# Patient Record
Sex: MALE | Race: WHITE | NOT HISPANIC OR LATINO | Employment: OTHER | URBAN - METROPOLITAN AREA
[De-identification: names, ages, dates, MRNs, and addresses within clinical notes are randomized per-mention and may not be internally consistent; named-entity substitution may affect disease eponyms.]

---

## 2018-01-18 ENCOUNTER — GENERIC CONVERSION - ENCOUNTER (OUTPATIENT)
Dept: OTHER | Facility: OTHER | Age: 63
End: 2018-01-18

## 2018-01-19 ENCOUNTER — ALLSCRIPTS OFFICE VISIT (OUTPATIENT)
Dept: OTHER | Facility: OTHER | Age: 63
End: 2018-01-19

## 2018-01-19 ENCOUNTER — TRANSCRIBE ORDERS (OUTPATIENT)
Dept: ADMINISTRATIVE | Facility: HOSPITAL | Age: 63
End: 2018-01-19

## 2018-01-19 DIAGNOSIS — N20.0 KIDNEY STONE: Primary | ICD-10-CM

## 2018-01-19 LAB
BILIRUB UR QL STRIP: NORMAL
CLARITY UR: NORMAL
COLOR UR: YELLOW
GLUCOSE (HISTORICAL): NORMAL
HGB UR QL STRIP.AUTO: NORMAL
KETONES UR STRIP-MCNC: NORMAL MG/DL
LEUKOCYTE ESTERASE UR QL STRIP: NORMAL
NITRITE UR QL STRIP: NORMAL
PH UR STRIP.AUTO: 5 [PH]
PROT UR STRIP-MCNC: NORMAL MG/DL
SP GR UR STRIP.AUTO: 1.02
UROBILINOGEN UR QL STRIP.AUTO: 0.2

## 2018-01-22 VITALS
DIASTOLIC BLOOD PRESSURE: 80 MMHG | SYSTOLIC BLOOD PRESSURE: 140 MMHG | HEIGHT: 69 IN | WEIGHT: 225 LBS | BODY MASS INDEX: 33.33 KG/M2

## 2018-01-23 NOTE — MISCELLANEOUS
Message     Recorded as Task   Date: 01/18/2018 08:42 AM, Created By: Misael Norton   Task Name: Call Back   Assigned To: Morris Saenz,TEAM   Regarding Patient: Mitali Draper, Status: In Progress   Comment:    Karla Sands - 18 Jan 2018 8:42 AM     TASK CREATED  Caller: Self; [phone] (Home)  Pt of PB c/o frequency and cramping and would like to talk to a nurse  [phone]   Clarita Pettit - 18 Jan 2018 9:10 AM     TASK IN PROGRESS   Clarita Pettit - 18 Jan 2018 9:17 AM     TASK EDITED  Spoke with patient; He states that he has a hx of kidney stones and is currently experiencing intermittent LLQ pain and frequency of urination  He adds that he has also experienced nausea, vomiting, and diarrhea  Because Dr Andre Major does not have available appointments, he agreed to be scheduled with Linn Alcantara on 01/19/2018 at 10:45  Active Problems    1   Elbow pain, unspecified laterality ([id number]) ([id number])    Signatures   Electronically signed by : Lavell Dockery, ; Jan 18 2018  9:17AM EST                       (Author)

## 2018-01-24 ENCOUNTER — HOSPITAL ENCOUNTER (OUTPATIENT)
Dept: CT IMAGING | Facility: HOSPITAL | Age: 63
Discharge: HOME | End: 2018-01-24
Payer: COMMERCIAL

## 2018-01-24 DIAGNOSIS — N20.0 KIDNEY STONE: ICD-10-CM

## 2018-01-24 PROCEDURE — 74176 CT ABD & PELVIS W/O CONTRAST: CPT

## 2018-02-02 RX ORDER — AZITHROMYCIN 250 MG/1
TABLET, FILM COATED ORAL
COMMUNITY
Start: 2018-01-23

## 2018-02-02 RX ORDER — ALBUTEROL SULFATE 90 UG/1
2 AEROSOL, METERED RESPIRATORY (INHALATION)
COMMUNITY
Start: 2018-01-23

## 2018-02-02 RX ORDER — PRAVASTATIN SODIUM 20 MG
20 TABLET ORAL
COMMUNITY
Start: 2017-11-22

## 2018-02-02 RX ORDER — FLUTICASONE PROPIONATE 50 MCG
2 SPRAY, SUSPENSION (ML) NASAL
COMMUNITY
Start: 2018-01-23

## 2018-02-02 RX ORDER — MONTELUKAST SODIUM 10 MG/1
10 TABLET ORAL
COMMUNITY
Start: 2018-01-15

## 2018-02-05 ENCOUNTER — APPOINTMENT (OUTPATIENT)
Dept: LAB | Facility: HOSPITAL | Age: 63
End: 2018-02-05
Payer: COMMERCIAL

## 2018-02-05 ENCOUNTER — DOCUMENTATION (OUTPATIENT)
Dept: UROLOGY | Facility: MEDICAL CENTER | Age: 63
End: 2018-02-05

## 2018-02-05 ENCOUNTER — OFFICE VISIT (OUTPATIENT)
Dept: UROLOGY | Facility: MEDICAL CENTER | Age: 63
End: 2018-02-05
Payer: COMMERCIAL

## 2018-02-05 VITALS
BODY MASS INDEX: 34.22 KG/M2 | WEIGHT: 239 LBS | SYSTOLIC BLOOD PRESSURE: 120 MMHG | DIASTOLIC BLOOD PRESSURE: 70 MMHG | HEIGHT: 70 IN

## 2018-02-05 DIAGNOSIS — K86.2 PANCREATIC CYST: ICD-10-CM

## 2018-02-05 DIAGNOSIS — R10.32 LEFT LOWER QUADRANT PAIN: ICD-10-CM

## 2018-02-05 DIAGNOSIS — Z87.898 HISTORY OF DYSURIA: Primary | ICD-10-CM

## 2018-02-05 DIAGNOSIS — K86.2 PANCREATIC CYST: Primary | ICD-10-CM

## 2018-02-05 LAB
BUN SERPL-MCNC: 21 MG/DL (ref 5–25)
CREAT SERPL-MCNC: 1.06 MG/DL (ref 0.6–1.3)
GFR SERPL CREATININE-BSD FRML MDRD: 74 ML/MIN/1.73SQ M
SL AMB  POCT GLUCOSE, UA: ABNORMAL
SL AMB LEUKOCYTE ESTERASE,UA: ABNORMAL
SL AMB POCT BILIRUBIN,UA: ABNORMAL
SL AMB POCT BLOOD,UA: ABNORMAL
SL AMB POCT CLARITY,UA: CLEAR
SL AMB POCT COLOR,UA: YELLOW
SL AMB POCT KETONES,UA: ABNORMAL
SL AMB POCT NITRITE,UA: ABNORMAL
SL AMB POCT PH,UA: 5
SL AMB POCT SPECIFIC GRAVITY,UA: 1.02
SL AMB POCT URINE PROTEIN: ABNORMAL
SL AMB POCT UROBILINOGEN: ABNORMAL

## 2018-02-05 PROCEDURE — 82565 ASSAY OF CREATININE: CPT

## 2018-02-05 PROCEDURE — 81003 URINALYSIS AUTO W/O SCOPE: CPT | Performed by: NURSE PRACTITIONER

## 2018-02-05 PROCEDURE — 99213 OFFICE O/P EST LOW 20 MIN: CPT | Performed by: NURSE PRACTITIONER

## 2018-02-05 PROCEDURE — 84520 ASSAY OF UREA NITROGEN: CPT

## 2018-02-05 PROCEDURE — 36415 COLL VENOUS BLD VENIPUNCTURE: CPT

## 2018-02-05 RX ORDER — METHYLPREDNISOLONE 4 MG/1
TABLET ORAL
Refills: 0 | COMMUNITY
Start: 2018-01-15

## 2018-02-05 NOTE — PROGRESS NOTES
I have reviewed the notes, assessments, and/or procedures performed by ASHLEY, I concur with her/his documentation of Dereck Walker

## 2018-02-05 NOTE — PROGRESS NOTES
2/5/2018    Devorah Wang  1955  [id number]        Assessment  Left sided abdominal pain ; resolved  Pancreatic cyst  Known renal cyst    Plan  MRI abdomen with and without to evaluate pancreatic cyst   Will call with results and refer if needed        Discussion  Minh Malave is a 57 y o  male being managed        History of Present Illness  57 y o  male presents today for abdominal pain  follow up  Since last visit has had no pain or urinating issues  Here for FU  CT results  Review of Systems  Review of Systems - History obtained from chart review and the patient  General ROS: negative  Respiratory ROS: no cough, shortness of breath, or wheezing  Gastrointestinal ROS: no abdominal pain, change in bowel habits, or black or bloody stools  Genito-Urinary ROS: no dysuria, trouble voiding, or hematuria  Musculoskeletal ROS: negative          Past Medical History  Past Medical History:   Diagnosis Date    BPH without obstruction/lower urinary tract symptoms     Hypercholesterolemia     Kidney cysts     Kidney stone        Past Social History  Past Surgical History:   Procedure Laterality Date    KNEE SURGERY      ROTATOR CUFF REPAIR         Past Family History  Family History   Problem Relation Age of Onset    Heart attack Father        Past Social history  Social History     Social History    Marital status: /Civil Union     Spouse name: N/A    Number of children: N/A    Years of education: N/A     Occupational History    Not on file       Social History Main Topics    Smoking status: Never Smoker    Smokeless tobacco: Never Used    Alcohol use Yes      Comment: social    Drug use: No    Sexual activity: Not on file     Other Topics Concern    Not on file     Social History Narrative    No narrative on file       Current Medications  Current Outpatient Prescriptions   Medication Sig Dispense Refill    aspirin 81 MG tablet Take 81 mg by mouth      pravastatin (PRAVACHOL) 20 mg tablet Take 20 mg by mouth      albuterol (PROVENTIL HFA,VENTOLIN HFA) 90 mcg/act inhaler Inhale 2 puffs      azithromycin (ZITHROMAX) 250 mg tablet Take two tablets by mouth on first day, then 1 tablet daily until gone      fluticasone (FLONASE) 50 mcg/act nasal spray 2 sprays into each nostril      Methylprednisolone 4 MG TBPK FOLLOW PACKAGE DIRECTIONS  0    montelukast (SINGULAIR) 10 mg tablet Take 10 mg by mouth       No current facility-administered medications for this visit  Allergies  No Known Allergies    Past Medical History, Social History, Family History, medications and allergies were reviewed  Vitals  Vitals:    02/05/18 1047   BP: 120/70   Weight: 108 kg (239 lb)   Height: 5' 10" (1 778 m)       Physical Exam  Skin: warm, dry, intact  Cardiac: S1S2, HRR, Peripheral edema: negative  Pulmonary: Non-labored breathing  Abdomen: Soft, non-tender, non-distended  No surgical scars  No masses, tenderness, hernias noted  Musculoskeletal: AROM with no joint deformity or tenderness  Neurology: alert, oriented x3, affect appropriate  Genitourinary: Negative CVA tenderness, negative suprapubic tenderness            Results  CT scan : as noted ; cyst in pancreatic body needs evaluation

## 2018-02-08 ENCOUNTER — APPOINTMENT (OUTPATIENT)
Dept: RADIOLOGY | Facility: CLINIC | Age: 63
End: 2018-02-08
Payer: COMMERCIAL

## 2018-02-08 ENCOUNTER — HOSPITAL ENCOUNTER (OUTPATIENT)
Dept: MRI IMAGING | Facility: CLINIC | Age: 63
Discharge: HOME | End: 2018-02-08
Payer: COMMERCIAL

## 2018-02-08 DIAGNOSIS — R10.32 LEFT LOWER QUADRANT PAIN: ICD-10-CM

## 2018-02-08 DIAGNOSIS — K86.2 PANCREATIC CYST: ICD-10-CM

## 2018-02-08 PROCEDURE — A9585 GADOBUTROL INJECTION: HCPCS | Performed by: NURSE PRACTITIONER

## 2018-02-08 PROCEDURE — 70030 X-RAY EYE FOR FOREIGN BODY: CPT

## 2018-02-08 PROCEDURE — 74183 MRI ABD W/O CNTR FLWD CNTR: CPT

## 2018-02-08 PROCEDURE — 76377 3D RENDER W/INTRP POSTPROCES: CPT

## 2018-02-08 RX ADMIN — GADOBUTROL 10 ML: 604.72 INJECTION INTRAVENOUS at 11:21

## 2020-01-06 ENCOUNTER — TRANSCRIBE ORDERS (OUTPATIENT)
Dept: ADMINISTRATIVE | Facility: HOSPITAL | Age: 65
End: 2020-01-06

## 2020-01-06 DIAGNOSIS — M25.562 LEFT KNEE PAIN, UNSPECIFIED CHRONICITY: Primary | ICD-10-CM

## 2020-01-16 ENCOUNTER — HOSPITAL ENCOUNTER (OUTPATIENT)
Dept: MRI IMAGING | Facility: HOSPITAL | Age: 65
Discharge: HOME | End: 2020-01-16
Payer: MEDICARE

## 2020-01-16 DIAGNOSIS — M25.562 LEFT KNEE PAIN, UNSPECIFIED CHRONICITY: ICD-10-CM

## 2020-01-16 PROCEDURE — 73721 MRI JNT OF LWR EXTRE W/O DYE: CPT

## 2020-04-23 ENCOUNTER — EVALUATION (OUTPATIENT)
Dept: PHYSICAL THERAPY | Facility: CLINIC | Age: 65
End: 2020-04-23
Payer: MEDICARE

## 2020-04-23 DIAGNOSIS — G89.29 CHRONIC LEFT-SIDED LOW BACK PAIN WITHOUT SCIATICA: Primary | ICD-10-CM

## 2020-04-23 DIAGNOSIS — M54.50 CHRONIC LEFT-SIDED LOW BACK PAIN WITHOUT SCIATICA: Primary | ICD-10-CM

## 2020-04-23 PROCEDURE — 97161 PT EVAL LOW COMPLEX 20 MIN: CPT | Performed by: PHYSICAL THERAPIST

## 2020-04-23 PROCEDURE — 97110 THERAPEUTIC EXERCISES: CPT | Performed by: PHYSICAL THERAPIST

## 2020-04-23 PROCEDURE — 97140 MANUAL THERAPY 1/> REGIONS: CPT | Performed by: PHYSICAL THERAPIST

## 2020-04-28 ENCOUNTER — OFFICE VISIT (OUTPATIENT)
Dept: PHYSICAL THERAPY | Facility: CLINIC | Age: 65
End: 2020-04-28
Payer: MEDICARE

## 2020-04-28 DIAGNOSIS — G89.29 CHRONIC LEFT-SIDED LOW BACK PAIN WITHOUT SCIATICA: Primary | ICD-10-CM

## 2020-04-28 DIAGNOSIS — M54.50 CHRONIC LEFT-SIDED LOW BACK PAIN WITHOUT SCIATICA: Primary | ICD-10-CM

## 2020-04-28 PROCEDURE — 97110 THERAPEUTIC EXERCISES: CPT | Performed by: PHYSICAL THERAPIST

## 2020-04-28 PROCEDURE — 97140 MANUAL THERAPY 1/> REGIONS: CPT | Performed by: PHYSICAL THERAPIST

## 2020-04-28 PROCEDURE — 97112 NEUROMUSCULAR REEDUCATION: CPT | Performed by: PHYSICAL THERAPIST

## 2020-04-30 ENCOUNTER — OFFICE VISIT (OUTPATIENT)
Dept: PHYSICAL THERAPY | Facility: CLINIC | Age: 65
End: 2020-04-30
Payer: MEDICARE

## 2020-04-30 DIAGNOSIS — G89.29 CHRONIC LEFT-SIDED LOW BACK PAIN WITHOUT SCIATICA: Primary | ICD-10-CM

## 2020-04-30 DIAGNOSIS — M54.50 CHRONIC LEFT-SIDED LOW BACK PAIN WITHOUT SCIATICA: Primary | ICD-10-CM

## 2020-04-30 PROCEDURE — 97140 MANUAL THERAPY 1/> REGIONS: CPT

## 2020-04-30 PROCEDURE — 97110 THERAPEUTIC EXERCISES: CPT

## 2020-04-30 PROCEDURE — 97112 NEUROMUSCULAR REEDUCATION: CPT

## 2020-05-05 ENCOUNTER — OFFICE VISIT (OUTPATIENT)
Dept: PHYSICAL THERAPY | Facility: CLINIC | Age: 65
End: 2020-05-05
Payer: MEDICARE

## 2020-05-05 DIAGNOSIS — M54.50 CHRONIC LEFT-SIDED LOW BACK PAIN WITHOUT SCIATICA: Primary | ICD-10-CM

## 2020-05-05 DIAGNOSIS — G89.29 CHRONIC LEFT-SIDED LOW BACK PAIN WITHOUT SCIATICA: Primary | ICD-10-CM

## 2020-05-05 PROCEDURE — 97112 NEUROMUSCULAR REEDUCATION: CPT

## 2020-05-05 PROCEDURE — 97140 MANUAL THERAPY 1/> REGIONS: CPT

## 2020-05-05 PROCEDURE — 97110 THERAPEUTIC EXERCISES: CPT

## 2020-05-07 ENCOUNTER — OFFICE VISIT (OUTPATIENT)
Dept: PHYSICAL THERAPY | Facility: CLINIC | Age: 65
End: 2020-05-07
Payer: MEDICARE

## 2020-05-07 DIAGNOSIS — G89.29 CHRONIC LEFT-SIDED LOW BACK PAIN WITHOUT SCIATICA: Primary | ICD-10-CM

## 2020-05-07 DIAGNOSIS — M54.50 CHRONIC LEFT-SIDED LOW BACK PAIN WITHOUT SCIATICA: Primary | ICD-10-CM

## 2020-05-07 PROCEDURE — 97140 MANUAL THERAPY 1/> REGIONS: CPT | Performed by: PHYSICAL THERAPIST

## 2020-05-07 PROCEDURE — 97110 THERAPEUTIC EXERCISES: CPT | Performed by: PHYSICAL THERAPIST

## 2020-05-12 ENCOUNTER — OFFICE VISIT (OUTPATIENT)
Dept: PHYSICAL THERAPY | Facility: CLINIC | Age: 65
End: 2020-05-12
Payer: MEDICARE

## 2020-05-12 DIAGNOSIS — M54.50 CHRONIC LEFT-SIDED LOW BACK PAIN WITHOUT SCIATICA: Primary | ICD-10-CM

## 2020-05-12 DIAGNOSIS — G89.29 CHRONIC LEFT-SIDED LOW BACK PAIN WITHOUT SCIATICA: Primary | ICD-10-CM

## 2020-05-12 PROCEDURE — 97140 MANUAL THERAPY 1/> REGIONS: CPT | Performed by: PHYSICAL THERAPIST

## 2020-05-12 PROCEDURE — 97112 NEUROMUSCULAR REEDUCATION: CPT | Performed by: PHYSICAL THERAPIST

## 2020-05-14 ENCOUNTER — APPOINTMENT (OUTPATIENT)
Dept: PHYSICAL THERAPY | Facility: CLINIC | Age: 65
End: 2020-05-14
Payer: MEDICARE

## 2020-05-21 ENCOUNTER — OFFICE VISIT (OUTPATIENT)
Dept: PHYSICAL THERAPY | Facility: CLINIC | Age: 65
End: 2020-05-21
Payer: MEDICARE

## 2020-05-21 DIAGNOSIS — M54.50 CHRONIC LEFT-SIDED LOW BACK PAIN WITHOUT SCIATICA: Primary | ICD-10-CM

## 2020-05-21 DIAGNOSIS — G89.29 CHRONIC LEFT-SIDED LOW BACK PAIN WITHOUT SCIATICA: Primary | ICD-10-CM

## 2020-05-21 PROCEDURE — 97110 THERAPEUTIC EXERCISES: CPT | Performed by: PHYSICAL THERAPIST

## 2020-05-21 PROCEDURE — 97014 ELECTRIC STIMULATION THERAPY: CPT | Performed by: PHYSICAL THERAPIST

## 2020-05-21 PROCEDURE — 97112 NEUROMUSCULAR REEDUCATION: CPT | Performed by: PHYSICAL THERAPIST

## 2020-05-26 ENCOUNTER — OFFICE VISIT (OUTPATIENT)
Dept: PHYSICAL THERAPY | Facility: CLINIC | Age: 65
End: 2020-05-26
Payer: MEDICARE

## 2020-05-26 DIAGNOSIS — G89.29 CHRONIC LEFT-SIDED LOW BACK PAIN WITHOUT SCIATICA: Primary | ICD-10-CM

## 2020-05-26 DIAGNOSIS — M54.50 CHRONIC LEFT-SIDED LOW BACK PAIN WITHOUT SCIATICA: Primary | ICD-10-CM

## 2020-05-26 PROCEDURE — 97110 THERAPEUTIC EXERCISES: CPT

## 2020-05-26 PROCEDURE — 97014 ELECTRIC STIMULATION THERAPY: CPT

## 2020-05-26 PROCEDURE — 97140 MANUAL THERAPY 1/> REGIONS: CPT

## 2020-05-28 ENCOUNTER — OFFICE VISIT (OUTPATIENT)
Dept: PHYSICAL THERAPY | Facility: CLINIC | Age: 65
End: 2020-05-28
Payer: MEDICARE

## 2020-05-28 DIAGNOSIS — G89.29 CHRONIC LEFT-SIDED LOW BACK PAIN WITHOUT SCIATICA: Primary | ICD-10-CM

## 2020-05-28 DIAGNOSIS — M54.50 CHRONIC LEFT-SIDED LOW BACK PAIN WITHOUT SCIATICA: Primary | ICD-10-CM

## 2020-05-28 PROCEDURE — 97112 NEUROMUSCULAR REEDUCATION: CPT

## 2020-05-28 PROCEDURE — 97140 MANUAL THERAPY 1/> REGIONS: CPT

## 2020-06-18 ENCOUNTER — HOSPITAL ENCOUNTER (OUTPATIENT)
Dept: RADIOLOGY | Facility: HOSPITAL | Age: 65
Discharge: HOME | End: 2020-06-18
Payer: MEDICARE

## 2020-06-18 ENCOUNTER — TRANSCRIBE ORDERS (OUTPATIENT)
Dept: ADMINISTRATIVE | Facility: HOSPITAL | Age: 65
End: 2020-06-18

## 2020-06-18 DIAGNOSIS — M54.50 LUMBAR PAIN: Primary | ICD-10-CM

## 2020-06-18 DIAGNOSIS — M54.50 LUMBAR PAIN: ICD-10-CM

## 2020-06-18 PROCEDURE — 72114 X-RAY EXAM L-S SPINE BENDING: CPT

## 2021-03-10 DIAGNOSIS — Z23 ENCOUNTER FOR IMMUNIZATION: ICD-10-CM

## 2021-09-17 ENCOUNTER — NURSE TRIAGE (OUTPATIENT)
Dept: OTHER | Facility: OTHER | Age: 66
End: 2021-09-17

## 2021-09-17 DIAGNOSIS — Z20.828 SARS-ASSOCIATED CORONAVIRUS EXPOSURE: Primary | ICD-10-CM

## 2021-09-17 NOTE — TELEPHONE ENCOUNTER
Regarding: Covid Exposure Asymptomatic   ----- Message from [facility] sent at 9/17/2021  2:23 PM EDT -----  "exposed to someone, it was my wife we both got tested previously on the 8th and hers came back positive and mine negative, my docs nurse told me to get another test after 5 days its been 9  Im not having symptoms really "

## 2021-09-18 PROCEDURE — U0005 INFEC AGEN DETEC AMPLI PROBE: HCPCS | Performed by: FAMILY MEDICINE

## 2021-09-18 PROCEDURE — U0003 INFECTIOUS AGENT DETECTION BY NUCLEIC ACID (DNA OR RNA); SEVERE ACUTE RESPIRATORY SYNDROME CORONAVIRUS 2 (SARS-COV-2) (CORONAVIRUS DISEASE [COVID-19]), AMPLIFIED PROBE TECHNIQUE, MAKING USE OF HIGH THROUGHPUT TECHNOLOGIES AS DESCRIBED BY CMS-2020-01-R: HCPCS | Performed by: FAMILY MEDICINE

## 2021-09-18 NOTE — TELEPHONE ENCOUNTER
Regarding: Exposure to covid   ----- Message from An Damon sent at 9/17/2021  7:14 PM EDT -----  " I am in contact with my wife who tested positive for covid "

## 2021-09-18 NOTE — TELEPHONE ENCOUNTER
Reason for Disposition   [1] CLOSE CONTACT COVID-19 EXPOSURE within last 14 days AND [2] NO symptoms    Answer Assessment - Initial Assessment Questions  1  Were you within 6 feet or less, for up to 15 minutes or more with a person that has a confirmed COVID-19 test?   Yes     2  What was the date of your exposure? 9/8    3  Are you experiencing any symptoms attributed to the virus?  (Assess for SOB, cough, fever, difficulty breathing)   No     4   HIGH RISK: Do you have any history heart or lung conditions, weakened immune system, diabetes, Asthma, CHF, HIV, COPD, Chemo, renal failure, sickle cell, etc?   No    Protocols used: CORONAVIRUS (COVID-19) EXPOSURE-ADULT-AH

## 2024-07-31 ENCOUNTER — HOSPITAL ENCOUNTER (OUTPATIENT)
Dept: MRI IMAGING | Facility: CLINIC | Age: 69
Discharge: HOME | End: 2024-07-31
Payer: MEDICARE

## 2024-07-31 DIAGNOSIS — M43.06 SPONDYLOLYSIS, LUMBAR REGION: ICD-10-CM

## 2024-07-31 PROCEDURE — A9585 GADOBUTROL INJECTION: HCPCS | Performed by: RADIOLOGY

## 2024-07-31 PROCEDURE — 72158 MRI LUMBAR SPINE W/O & W/DYE: CPT

## 2024-07-31 RX ORDER — GADOBUTROL 604.72 MG/ML
10 INJECTION INTRAVENOUS
Status: COMPLETED | OUTPATIENT
Start: 2024-07-31 | End: 2024-07-31

## 2024-07-31 RX ADMIN — GADOBUTROL 10 ML: 604.72 INJECTION INTRAVENOUS at 13:30

## 2024-12-05 ENCOUNTER — APPOINTMENT (OUTPATIENT)
Dept: RADIOLOGY | Facility: CLINIC | Age: 69
End: 2024-12-05
Payer: MEDICARE

## 2024-12-05 DIAGNOSIS — R20.2 PARESTHESIA: ICD-10-CM

## 2024-12-05 DIAGNOSIS — R20.2 NUMBNESS AND TINGLING: ICD-10-CM

## 2024-12-05 DIAGNOSIS — R20.0 NUMBNESS AND TINGLING: ICD-10-CM

## 2024-12-05 PROCEDURE — 72050 X-RAY EXAM NECK SPINE 4/5VWS: CPT

## 2024-12-20 ENCOUNTER — OFFICE VISIT (OUTPATIENT)
Dept: LAB | Facility: HOSPITAL | Age: 69
End: 2024-12-20
Payer: MEDICARE

## 2024-12-20 DIAGNOSIS — Z01.810 ENCOUNTER FOR PREPROCEDURAL CARDIOVASCULAR EXAMINATION: ICD-10-CM

## 2024-12-20 DIAGNOSIS — Z86.0101 PERSONAL HISTORY OF ADENOMATOUS AND SERRATED COLON POLYPS: ICD-10-CM

## 2024-12-20 LAB
ATRIAL RATE: 57 BPM
P AXIS: 37 DEGREES
PR INTERVAL: 190 MS
QRS AXIS: 1 DEGREES
QRSD INTERVAL: 100 MS
QT INTERVAL: 436 MS
QTC INTERVAL: 425 MS
T WAVE AXIS: 34 DEGREES
VENTRICULAR RATE: 57 BPM

## 2024-12-20 PROCEDURE — 93010 ELECTROCARDIOGRAM REPORT: CPT | Performed by: STUDENT IN AN ORGANIZED HEALTH CARE EDUCATION/TRAINING PROGRAM

## 2024-12-20 PROCEDURE — 93005 ELECTROCARDIOGRAM TRACING: CPT

## 2025-06-30 ENCOUNTER — TELEPHONE (OUTPATIENT)
Age: 70
End: 2025-06-30

## 2025-06-30 ENCOUNTER — DOCUMENTATION (OUTPATIENT)
Dept: HEMATOLOGY ONCOLOGY | Facility: CLINIC | Age: 70
End: 2025-06-30

## 2025-06-30 NOTE — TELEPHONE ENCOUNTER
I called Petros in response to a referral that was received for patient to establish care with Surgical Oncology    Outreach was made to schedule a consultation.    A consultation was scheduled for patient during this call. Patient is scheduled on 7/17/25 at 1:15 PM with Dr Healy at the [facility]  Has the patient been seen inpatient or outpatient ? (Within in the last 3 years) No If so when, N/A    Schedule within: 7 days  Awaiting Records: Yes   DO NOT SCHEDULE PRIOR TO 3 DAYS    Pt scheduled for first available appt at requested location.   Please advise if this appt can be moved sooner to meet scheduling guidelines per NN review.

## 2025-06-30 NOTE — PROGRESS NOTES
PN please retrieve outside records.  [] Email sent to pt with Authorization for Release of Health Information  Pt referred to surgical oncology and should be scheduled within 7 days.  Please notify me if not scheduled within time frame.    Referral received/ Chart reviewed for work up completed   Diagnosis: Melanoma in situ  Referring provider: Dr Irby    Imaging completed:    [] PET/CT   [] MRI   [] CT   [] US   [] Mammo   [] Bone scan   [x] N/A    Pathology completed:    Date:6/12/25 Right mid cheek skin shave biopsy   Location:[facility],  [phone]   []N/A    Additional records needed:    [] Genomic report   [] Genetic testing results   [] Office Note   [] Procedure/ Operative note   [] Lab results   [x] Colored photo of biopsied site      [] Radiation Oncology records retrieval needed (PN to route to rad/onc clerical pool once scheduled)  Date:  Location:

## 2025-07-01 ENCOUNTER — PATIENT OUTREACH (OUTPATIENT)
Dept: HEMATOLOGY ONCOLOGY | Facility: CLINIC | Age: 70
End: 2025-07-01

## 2025-07-01 NOTE — PROGRESS NOTES
Intake received, chart reviewed for need of external records.  Consulting: Dr. VARGAS  Scheduled on 7/7/2025  Dx: MELANOMA   ICD: C43.9    Pathology slides requested:   From MARY  phone [phone], via fax [phone]  Accession # [id number]  Slides will be sent directly to [facility] Pathology lab at [address] [place] [address].

## 2025-07-01 NOTE — TELEPHONE ENCOUNTER
Called and spoke to patient. Provided a sooner appointment with dr. Marx. Patient now scheduled on 7/7 at 9:00 am in [place]

## 2025-07-01 NOTE — PROGRESS NOTES
Initial outreach. Spoke to patient. Introduced myself and explained the role of an Oncology Nurse Navigator to assist with coordination of cancer care, preparation for upcoming appointment, be a point of contact prior to oncology consult, provide support and connect him with available resources. Discussed surgical oncology referral placed by Dr Roth for melanoma. Explained I will be their main contact until they are established with their team and a treatment plan is established.     Introduced Cony, and explained she will be his patient navigator, who will reach out after the first consult to introduce themselves. The PN will provide support, make sure he has a good understanding of the plan and schedule and to connect with additional resources if/when needed.     Reviewed upcoming appointments including date, time and location.  Assessed for barriers of care. All questions answered at this time. My direct contact information provided. Patient is aware that he can call me should he need any further assistance. Patient was appreciative of call.     Prior history of skin cancer: no    Dermatology Provider: Dr Roth  PCP: Cesar Payne MD  Insurance:  MEDICARE A AND B,  [facility] HEALTH OPTIONS PROGRAM     Do you have a history of cancer? no  Have you ever received Radiation Therapy? No  Where  did you receive RT?    When?   Do you have any implantable devices?   [] Pacemaker  [] Pain stimulator  [] Defibrillator  [] Implanted sleep apnea device  [x] NA      Was it placed at [facility]?    If not, where was it placed?     When?

## 2025-07-02 PROBLEM — D03.30 MELANOMA IN SITU OF FACE EXCLUDING EYELID, NOSE, LIP, AND EAR (HCC): Status: ACTIVE | Noted: 2025-07-02

## 2025-07-07 ENCOUNTER — CONSULT (OUTPATIENT)
Dept: SURGICAL ONCOLOGY | Facility: CLINIC | Age: 70
End: 2025-07-07
Payer: MEDICARE

## 2025-07-07 ENCOUNTER — HOSPITAL ENCOUNTER (OUTPATIENT)
Dept: RADIOLOGY | Facility: HOSPITAL | Age: 70
Discharge: HOME | End: 2025-07-07
Payer: MEDICARE

## 2025-07-07 VITALS
WEIGHT: 239 LBS | BODY MASS INDEX: 34.22 KG/M2 | SYSTOLIC BLOOD PRESSURE: 134 MMHG | DIASTOLIC BLOOD PRESSURE: 78 MMHG | OXYGEN SATURATION: 97 % | HEART RATE: 65 BPM | HEIGHT: 70 IN | TEMPERATURE: 97.9 F

## 2025-07-07 DIAGNOSIS — D03.30 MELANOMA IN SITU OF FACE EXCLUDING EYELID, NOSE, LIP, AND EAR (HCC): Primary | ICD-10-CM

## 2025-07-07 DIAGNOSIS — D03.30 MELANOMA IN SITU OF FACE EXCLUDING EYELID, NOSE, LIP, AND EAR (HCC): ICD-10-CM

## 2025-07-07 PROCEDURE — 99205 OFFICE O/P NEW HI 60 MIN: CPT | Performed by: SURGERY

## 2025-07-07 PROCEDURE — 71046 X-RAY EXAM CHEST 2 VIEWS: CPT

## 2025-07-07 RX ORDER — ACETAMINOPHEN AND CODEINE PHOSPHATE 300; 30 MG/1; MG/1
1 TABLET ORAL EVERY 6 HOURS PRN
Qty: 20 TABLET | Refills: 0 | Status: SHIPPED | OUTPATIENT
Start: 2025-07-07

## 2025-07-07 NOTE — PROGRESS NOTES
Name: Petros Lott      : 1955      MRN: [id number]  Encounter Provider: Pasha Marx MD  Encounter Date: 2025   Encounter department: [facility] [place]  :  Assessment & Plan  Melanoma in situ of face excluding eyelid, nose, lip, and ear (HCC)    Orders:    Case request operating room: WIDE RADICAL EXCISION OF RIGHT CHEEK MELANOMA; Standing    EKG 12 lead; Future    XR chest pa and lateral; Future    acetaminophen-codeine (TYLENOL with CODEINE #3) 300-30 MG per tablet; Take 1 tablet by mouth every 6 (six) hours as needed for moderate pain    This 70-year-old gentleman has been referred for surgical oncology consult Chin with newly diagnosed melanoma in situ right cheek.  He has had this skin lesion for quite some time recently it is discolored and dermatologist decided to perform shave biopsy.  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  He is here with his wife to discuss further workup and management.  He also recently had trauma to his right hand and being treated with splint and pending hand surgery evaluation today.  He is overall doing well.  We did discuss the skin cancers especially melanoma in situ as well as malignant melanoma.  Further management.  Surgical consent was obtained for wide radical excision after explaining the benefit procedure alternative and possible complications such as bleeding infection injury to the surrounding structures i.e. branches of facial nerve, scarring, local recurrence, need for additional procedures and treatment and distant disease were all reviewed and discussed..  We also discussed the cancer disease status cancer treatment plan and cancer treatment goals with the patient in detail.  All patient's and his wife's questions were answered to their satisfaction.  Consent will be obtained today and will proceed with surgery as soon as possible.    History of Present Illness   Chief  "Complaint   Patient presents with    Consult     NP-Right mid cheek melanoma in situ     No follow-ups on file.  S  Pertinent Medical History   Melanoma in situ        Oncology History   Cancer Staging   No matching staging information was found for the patient.  Oncology History   Melanoma in situ of face excluding eyelid, nose, lip, and ear (HCC)   6/12/2025 Initial Diagnosis    Melanoma in situ of face excluding eyelid, nose, lip, and ear (HCC)     6/12/2025 Biopsy    A. Skin, Right Mid Cheek Shave Biopsy  Melanoma In Situ, Lentigo Maligna type  1.2cm x 0.6cm          Review of Systems   Constitutional:  Negative for chills and fever.   HENT:  Negative for ear pain and sore throat.    Eyes:  Negative for pain and visual disturbance.   Respiratory:  Negative for cough and shortness of breath.    Cardiovascular:  Negative for chest pain and palpitations.   Gastrointestinal:  Negative for abdominal pain and vomiting.   Genitourinary:  Negative for dysuria and hematuria.   Musculoskeletal:  Positive for back pain and gait problem. Negative for arthralgias.   Skin:  Negative for color change and rash.   Neurological:  Negative for seizures and syncope.   Hematological:  Negative for adenopathy.   All other systems reviewed and are negative.   A complete review of systems is negative other than that noted above in the HPI.    Medical History Reviewed by provider this encounter:  Tobacco  Allergies  Meds  Problems  Med Hx  Surg Hx  Fam Hx     .         Objective   /78 (BP Location: Left arm, Patient Position: Sitting)   Pulse 65   Temp 97.9 °F (36.6 °C) (Temporal)   Ht 5' 10\" (1.778 m)   Wt 108 kg (239 lb)   SpO2 97%   BMI 34.29 kg/m²     Pain Screening:     ECOG    Physical Exam  HENT:      Head:        Comments: Right cheek biopsy-proven melanoma in situ.  No regional adenopathy.    Neurological:      Mental Status: He is oriented to person, place, and time. Mental status is at baseline.      " "  Constitutional: General appearance: The Patient is well-developed and well-nourished who appears the stated age in no acute distress. Patient is pleasant and talkative.  HEENT: Normocephalic. Sclerae are anicteric. Mucous membranes are moist. Neck is supple without adenopathy. No JVD.  Chest: The lungs are clear to auscultation.  Cardiac: Heart is regular rate.  Abdomen: Abdomen is soft, non-tender, non-distended and without masses.  Extremities: There is no clubbing or cyanosis. There is no edema. Symmetric.  Neuro: Grossly nonfocal. Gait is normal.  Lymphatic: No evidence of cervical adenopathy bilaterally.  No evidence of axillary adenopathy bilaterally. No evidence of inguinal adenopathy bilaterally.  Skin: Warm, anicteric.  Psych: Patient is pleasant and talkative    Labs: I have reviewed pertinent labs. No results found for: \"WBC\", \"RBC\", \"HGB\", \"HCT\", \"MCV\", \"MCH\", \"RDW\", \"PLT\", \"NEUTOPHILPCT\", \"BANDSPCT\", \"LYMPHOPCT\", \"MONOPCT\", \"EOSPCT\", \"BASOPCT\", \"IMMGRANS\", \"NEUTROABS\"   Lab Results   Component Value Date/Time    Sodium 141 06/02/2025 07:04 AM    Potassium 5.2 (H) 06/02/2025 07:04 AM    Chloride 103 06/02/2025 07:04 AM    Carbon Dioxide 25 06/02/2025 07:04 AM    Anion Gap 13 06/02/2025 07:04 AM    BUN 18 06/02/2025 07:04 AM    Creatinine 0.9 06/02/2025 07:04 AM    Glucose 103 06/02/2025 07:04 AM    Calcium 9.4 06/02/2025 07:04 AM    AST 19 06/02/2025 07:04 AM    ALT 24 06/02/2025 07:04 AM    Alkaline Phosphatase 58 06/02/2025 07:04 AM    Protein, Total 6.3 06/02/2025 07:04 AM    Albumin 4.2 06/02/2025 07:04 AM    Total Bilirubin 0.3 06/02/2025 07:04 AM    EGFR 90 06/02/2025 07:04 AM        Radiology Results Review : No pertinent imaging studies reviewed.      Administrative Statements   I have spent a total time of 55 minutes in caring for this patient on the day of the visit/encounter including Diagnostic results, Prognosis, Risks and benefits of tx options, Instructions for management, Patient and " family education, Importance of tx compliance, Risk factor reductions, Impressions, Counseling / Coordination of care, Documenting in the medical record, Reviewing/placing orders in the medical record (including tests, medications, and/or procedures), and Obtaining or reviewing history  .

## 2025-07-07 NOTE — H&P (VIEW-ONLY)
Name: Petros Lott      : 1955      MRN: [id number]  Encounter Provider: Pasha Marx MD  Encounter Date: 2025   Encounter department: [facility] [place]  :  Assessment & Plan  Melanoma in situ of face excluding eyelid, nose, lip, and ear (HCC)    Orders:    Case request operating room: WIDE RADICAL EXCISION OF RIGHT CHEEK MELANOMA; Standing    EKG 12 lead; Future    XR chest pa and lateral; Future    acetaminophen-codeine (TYLENOL with CODEINE #3) 300-30 MG per tablet; Take 1 tablet by mouth every 6 (six) hours as needed for moderate pain    This 70-year-old gentleman has been referred for surgical oncology consult Chin with newly diagnosed melanoma in situ right cheek.  He has had this skin lesion for quite some time recently it is discolored and dermatologist decided to perform shave biopsy.  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  He is here with his wife to discuss further workup and management.  He also recently had trauma to his right hand and being treated with splint and pending hand surgery evaluation today.  He is overall doing well.  We did discuss the skin cancers especially melanoma in situ as well as malignant melanoma.  Further management.  Surgical consent was obtained for wide radical excision after explaining the benefit procedure alternative and possible complications such as bleeding infection injury to the surrounding structures i.e. branches of facial nerve, scarring, local recurrence, need for additional procedures and treatment and distant disease were all reviewed and discussed..  We also discussed the cancer disease status cancer treatment plan and cancer treatment goals with the patient in detail.  All patient's and his wife's questions were answered to their satisfaction.  Consent will be obtained today and will proceed with surgery as soon as possible.    History of Present Illness   Chief  "Complaint   Patient presents with    Consult     NP-Right mid cheek melanoma in situ     No follow-ups on file.  S  Pertinent Medical History   Melanoma in situ        Oncology History   Cancer Staging   No matching staging information was found for the patient.  Oncology History   Melanoma in situ of face excluding eyelid, nose, lip, and ear (HCC)   6/12/2025 Initial Diagnosis    Melanoma in situ of face excluding eyelid, nose, lip, and ear (HCC)     6/12/2025 Biopsy    A. Skin, Right Mid Cheek Shave Biopsy  Melanoma In Situ, Lentigo Maligna type  1.2cm x 0.6cm          Review of Systems   Constitutional:  Negative for chills and fever.   HENT:  Negative for ear pain and sore throat.    Eyes:  Negative for pain and visual disturbance.   Respiratory:  Negative for cough and shortness of breath.    Cardiovascular:  Negative for chest pain and palpitations.   Gastrointestinal:  Negative for abdominal pain and vomiting.   Genitourinary:  Negative for dysuria and hematuria.   Musculoskeletal:  Positive for back pain and gait problem. Negative for arthralgias.   Skin:  Negative for color change and rash.   Neurological:  Negative for seizures and syncope.   Hematological:  Negative for adenopathy.   All other systems reviewed and are negative.   A complete review of systems is negative other than that noted above in the HPI.    Medical History Reviewed by provider this encounter:  Tobacco  Allergies  Meds  Problems  Med Hx  Surg Hx  Fam Hx     .         Objective   /78 (BP Location: Left arm, Patient Position: Sitting)   Pulse 65   Temp 97.9 °F (36.6 °C) (Temporal)   Ht 5' 10\" (1.778 m)   Wt 108 kg (239 lb)   SpO2 97%   BMI 34.29 kg/m²     Pain Screening:     ECOG    Physical Exam  HENT:      Head:        Comments: Right cheek biopsy-proven melanoma in situ.  No regional adenopathy.    Neurological:      Mental Status: He is oriented to person, place, and time. Mental status is at baseline.      " "  Constitutional: General appearance: The Patient is well-developed and well-nourished who appears the stated age in no acute distress. Patient is pleasant and talkative.  HEENT: Normocephalic. Sclerae are anicteric. Mucous membranes are moist. Neck is supple without adenopathy. No JVD.  Chest: The lungs are clear to auscultation.  Cardiac: Heart is regular rate.  Abdomen: Abdomen is soft, non-tender, non-distended and without masses.  Extremities: There is no clubbing or cyanosis. There is no edema. Symmetric.  Neuro: Grossly nonfocal. Gait is normal.  Lymphatic: No evidence of cervical adenopathy bilaterally.  No evidence of axillary adenopathy bilaterally. No evidence of inguinal adenopathy bilaterally.  Skin: Warm, anicteric.  Psych: Patient is pleasant and talkative    Labs: I have reviewed pertinent labs. No results found for: \"WBC\", \"RBC\", \"HGB\", \"HCT\", \"MCV\", \"MCH\", \"RDW\", \"PLT\", \"NEUTOPHILPCT\", \"BANDSPCT\", \"LYMPHOPCT\", \"MONOPCT\", \"EOSPCT\", \"BASOPCT\", \"IMMGRANS\", \"NEUTROABS\"   Lab Results   Component Value Date/Time    Sodium 141 06/02/2025 07:04 AM    Potassium 5.2 (H) 06/02/2025 07:04 AM    Chloride 103 06/02/2025 07:04 AM    Carbon Dioxide 25 06/02/2025 07:04 AM    Anion Gap 13 06/02/2025 07:04 AM    BUN 18 06/02/2025 07:04 AM    Creatinine 0.9 06/02/2025 07:04 AM    Glucose 103 06/02/2025 07:04 AM    Calcium 9.4 06/02/2025 07:04 AM    AST 19 06/02/2025 07:04 AM    ALT 24 06/02/2025 07:04 AM    Alkaline Phosphatase 58 06/02/2025 07:04 AM    Protein, Total 6.3 06/02/2025 07:04 AM    Albumin 4.2 06/02/2025 07:04 AM    Total Bilirubin 0.3 06/02/2025 07:04 AM    EGFR 90 06/02/2025 07:04 AM        Radiology Results Review : No pertinent imaging studies reviewed.      Administrative Statements   I have spent a total time of 55 minutes in caring for this patient on the day of the visit/encounter including Diagnostic results, Prognosis, Risks and benefits of tx options, Instructions for management, Patient and " family education, Importance of tx compliance, Risk factor reductions, Impressions, Counseling / Coordination of care, Documenting in the medical record, Reviewing/placing orders in the medical record (including tests, medications, and/or procedures), and Obtaining or reviewing history  .

## 2025-07-07 NOTE — ASSESSMENT & PLAN NOTE
Orders:    Case request operating room: WIDE RADICAL EXCISION OF RIGHT CHEEK MELANOMA; Standing    EKG 12 lead; Future    XR chest pa and lateral; Future    acetaminophen-codeine (TYLENOL with CODEINE #3) 300-30 MG per tablet; Take 1 tablet by mouth every 6 (six) hours as needed for moderate pain

## 2025-07-08 ENCOUNTER — OFFICE VISIT (OUTPATIENT)
Dept: LAB | Facility: HOSPITAL | Age: 70
End: 2025-07-08
Payer: MEDICARE

## 2025-07-08 DIAGNOSIS — D03.30 MELANOMA IN SITU OF FACE EXCLUDING EYELID, NOSE, LIP, AND EAR (HCC): ICD-10-CM

## 2025-07-08 LAB
ATRIAL RATE: 58 BPM
P AXIS: 21 DEGREES
PR INTERVAL: 184 MS
QRS AXIS: 15 DEGREES
QRSD INTERVAL: 98 MS
QT INTERVAL: 420 MS
QTC INTERVAL: 413 MS
T WAVE AXIS: 6 DEGREES
VENTRICULAR RATE: 58 BPM

## 2025-07-08 PROCEDURE — 93005 ELECTROCARDIOGRAM TRACING: CPT

## 2025-07-08 PROCEDURE — 93010 ELECTROCARDIOGRAM REPORT: CPT | Performed by: INTERNAL MEDICINE

## 2025-07-09 ENCOUNTER — PATIENT OUTREACH (OUTPATIENT)
Dept: HEMATOLOGY ONCOLOGY | Facility: CLINIC | Age: 70
End: 2025-07-09

## 2025-07-09 ENCOUNTER — LAB REQUISITION (OUTPATIENT)
Dept: LAB | Facility: HOSPITAL | Age: 70
End: 2025-07-09
Payer: MEDICARE

## 2025-07-09 DIAGNOSIS — C43.9 MALIGNANT MELANOMA OF SKIN, UNSPECIFIED (HCC): ICD-10-CM

## 2025-07-09 PROCEDURE — 88321 CONSLTJ&REPRT SLD PREP ELSWR: CPT | Performed by: PATHOLOGY

## 2025-07-17 RX ORDER — CEPHALEXIN 500 MG/1
500 CAPSULE ORAL
COMMUNITY
Start: 2025-07-07

## 2025-07-17 NOTE — PRE-PROCEDURE INSTRUCTIONS
Pre-Surgery Instructions:   Medication Instructions    albuterol (PROVENTIL HFA,VENTOLIN HFA) 90 mcg/act inhaler Uses PRN- OK to take day of surgery    aspirin 81 MG tablet Stop taking 7 days prior to surgery.    cephalexin (KEFLEX) 500 mg capsule Will be finished by surgery    fluticasone (FLONASE) 50 mcg/act nasal spray Uses PRN- OK to take day of surgery    pravastatin (PRAVACHOL) 20 mg tablet Take day of surgery.     Spoke with pt via phone.    Medication instructions for day of surgery reviewed. Patient verbalized understanding and agrees with the plan.  Please take all instructed medications with only a sip of water. Please do not take any over the counter (non-prescribed) vitamins or supplements for one week prior to date of surgery.      You will receive a call one business day prior to surgery with an arrival time and hospital directions. If your surgery is scheduled on a Monday, the hospital will be calling you on the Friday prior to your surgery. If you have not heard from anyone by 8pm, please call the hospital supervisor through the hospital  at [phone]. ([place] [phone] or [place] [phone]).    Do not eat or drink anything after midnight the night before your surgery, including candy, mints, lifesavers, or chewing gum. Do not drink alcohol 24hrs before your surgery. Try not to smoke at least 24hrs before your surgery.       Follow the pre surgery showering instructions as listed in the “My Surgical Experience Booklet” or otherwise provided by your surgeon's office. Do not use a blade to shave the surgical area 1 week before surgery. It is okay to use a clean electric clippers up to 24 hours before surgery. Do not apply any lotions, creams, including makeup, cologne, deodorant, or perfumes after showering on the day of your surgery. Do not use dry shampoo, hair spray, hair gel, or any type of hair products.     No contact lenses, eye make-up, or artificial eyelashes. Remove nail  polish, including gel polish, and any artificial, gel, or acrylic nails if possible. Remove all jewelry including rings and body piercing jewelry.     Wear causal clothing that is easy to take on and off. Consider your type of surgery.    Keep any valuables, jewelry, piercings at home. Please bring any specially ordered equipment (sling, braces) if indicated.    Arrange for a responsible person to drive you to and from the hospital on the day of your surgery. Please confirm the visitor policy for the day of your procedure when you receive your phone call with an arrival time.     Call the surgeon's office with any new illnesses, exposures, or additional questions prior to surgery.    Please reference your “My Surgical Experience Booklet” for additional information to prepare for your upcoming surgery.

## 2025-07-24 ENCOUNTER — ANESTHESIA EVENT (OUTPATIENT)
Dept: PERIOP | Facility: HOSPITAL | Age: 70
End: 2025-07-24
Payer: MEDICARE

## 2025-07-24 ENCOUNTER — HOSPITAL ENCOUNTER (OUTPATIENT)
Facility: HOSPITAL | Age: 70
Setting detail: OUTPATIENT SURGERY
Discharge: HOME | End: 2025-07-24
Attending: SURGERY | Admitting: SURGERY
Payer: MEDICARE

## 2025-07-24 ENCOUNTER — ANESTHESIA (OUTPATIENT)
Dept: PERIOP | Facility: HOSPITAL | Age: 70
End: 2025-07-24
Payer: MEDICARE

## 2025-07-24 VITALS
TEMPERATURE: 97.5 F | HEIGHT: 70 IN | RESPIRATION RATE: 13 BRPM | HEART RATE: 50 BPM | BODY MASS INDEX: 33.46 KG/M2 | WEIGHT: 233.69 LBS | DIASTOLIC BLOOD PRESSURE: 78 MMHG | SYSTOLIC BLOOD PRESSURE: 151 MMHG | OXYGEN SATURATION: 97 %

## 2025-07-24 DIAGNOSIS — D03.30 MELANOMA IN SITU OF FACE EXCLUDING EYELID, NOSE, LIP, AND EAR (HCC): ICD-10-CM

## 2025-07-24 PROCEDURE — 14301 TIS TRNFR ANY 30.1-60 SQ CM: CPT | Performed by: SURGERY

## 2025-07-24 PROCEDURE — 14302 TIS TRNFR ADDL 30 SQ CM: CPT | Performed by: SURGERY

## 2025-07-24 PROCEDURE — 14302 TIS TRNFR ADDL 30 SQ CM: CPT | Performed by: CLINICAL NURSE SPECIALIST

## 2025-07-24 PROCEDURE — 88305 TISSUE EXAM BY PATHOLOGIST: CPT | Performed by: PATHOLOGY

## 2025-07-24 PROCEDURE — 14301 TIS TRNFR ANY 30.1-60 SQ CM: CPT | Performed by: CLINICAL NURSE SPECIALIST

## 2025-07-24 RX ORDER — HYDROMORPHONE HCL/PF 1 MG/ML
0.5 SYRINGE (ML) INJECTION
Status: DISCONTINUED | OUTPATIENT
Start: 2025-07-24 | End: 2025-07-24 | Stop reason: HOSPADM

## 2025-07-24 RX ORDER — SODIUM CHLORIDE, SODIUM LACTATE, POTASSIUM CHLORIDE, CALCIUM CHLORIDE 600; 310; 30; 20 MG/100ML; MG/100ML; MG/100ML; MG/100ML
INJECTION, SOLUTION INTRAVENOUS CONTINUOUS PRN
Status: DISCONTINUED | OUTPATIENT
Start: 2025-07-24 | End: 2025-07-24

## 2025-07-24 RX ORDER — LIDOCAINE HYDROCHLORIDE 20 MG/ML
INJECTION, SOLUTION EPIDURAL; INFILTRATION; INTRACAUDAL; PERINEURAL AS NEEDED
Status: DISCONTINUED | OUTPATIENT
Start: 2025-07-24 | End: 2025-07-24

## 2025-07-24 RX ORDER — ONDANSETRON 2 MG/ML
INJECTION INTRAMUSCULAR; INTRAVENOUS AS NEEDED
Status: DISCONTINUED | OUTPATIENT
Start: 2025-07-24 | End: 2025-07-24

## 2025-07-24 RX ORDER — SODIUM CHLORIDE, SODIUM LACTATE, POTASSIUM CHLORIDE, CALCIUM CHLORIDE 600; 310; 30; 20 MG/100ML; MG/100ML; MG/100ML; MG/100ML
20 INJECTION, SOLUTION INTRAVENOUS CONTINUOUS
Status: DISCONTINUED | OUTPATIENT
Start: 2025-07-24 | End: 2025-07-24 | Stop reason: HOSPADM

## 2025-07-24 RX ORDER — ONDANSETRON 2 MG/ML
4 INJECTION INTRAMUSCULAR; INTRAVENOUS ONCE AS NEEDED
Status: DISCONTINUED | OUTPATIENT
Start: 2025-07-24 | End: 2025-07-24 | Stop reason: HOSPADM

## 2025-07-24 RX ORDER — MIDAZOLAM HYDROCHLORIDE 2 MG/2ML
INJECTION, SOLUTION INTRAMUSCULAR; INTRAVENOUS AS NEEDED
Status: DISCONTINUED | OUTPATIENT
Start: 2025-07-24 | End: 2025-07-24

## 2025-07-24 RX ORDER — DIPHENHYDRAMINE HYDROCHLORIDE 50 MG/ML
12.5 INJECTION, SOLUTION INTRAMUSCULAR; INTRAVENOUS
Status: DISCONTINUED | OUTPATIENT
Start: 2025-07-24 | End: 2025-07-24 | Stop reason: HOSPADM

## 2025-07-24 RX ORDER — FENTANYL CITRATE 50 UG/ML
INJECTION, SOLUTION INTRAMUSCULAR; INTRAVENOUS AS NEEDED
Status: DISCONTINUED | OUTPATIENT
Start: 2025-07-24 | End: 2025-07-24

## 2025-07-24 RX ORDER — SODIUM CHLORIDE, SODIUM LACTATE, POTASSIUM CHLORIDE, CALCIUM CHLORIDE 600; 310; 30; 20 MG/100ML; MG/100ML; MG/100ML; MG/100ML
75 INJECTION, SOLUTION INTRAVENOUS CONTINUOUS
OUTPATIENT
Start: 2025-07-24

## 2025-07-24 RX ORDER — FENTANYL CITRATE/PF 50 MCG/ML
25 SYRINGE (ML) INJECTION
Status: DISCONTINUED | OUTPATIENT
Start: 2025-07-24 | End: 2025-07-24 | Stop reason: HOSPADM

## 2025-07-24 RX ORDER — OXYCODONE AND ACETAMINOPHEN 5; 325 MG/1; MG/1
1 TABLET ORAL ONCE AS NEEDED
Status: COMPLETED | OUTPATIENT
Start: 2025-07-24 | End: 2025-07-24

## 2025-07-24 RX ORDER — ACETAMINOPHEN 325 MG/1
975 TABLET ORAL ONCE
Status: COMPLETED | OUTPATIENT
Start: 2025-07-24 | End: 2025-07-24

## 2025-07-24 RX ORDER — CEFAZOLIN SODIUM 2 G/50ML
2000 SOLUTION INTRAVENOUS ONCE
Status: COMPLETED | OUTPATIENT
Start: 2025-07-24 | End: 2025-07-24

## 2025-07-24 RX ORDER — PROPOFOL 10 MG/ML
INJECTION, EMULSION INTRAVENOUS AS NEEDED
Status: DISCONTINUED | OUTPATIENT
Start: 2025-07-24 | End: 2025-07-24

## 2025-07-24 RX ORDER — HYDRALAZINE HYDROCHLORIDE 20 MG/ML
10 INJECTION INTRAMUSCULAR; INTRAVENOUS
Status: DISCONTINUED | OUTPATIENT
Start: 2025-07-24 | End: 2025-07-24 | Stop reason: HOSPADM

## 2025-07-24 RX ORDER — DEXAMETHASONE SODIUM PHOSPHATE 10 MG/ML
INJECTION, SOLUTION INTRAMUSCULAR; INTRAVENOUS AS NEEDED
Status: DISCONTINUED | OUTPATIENT
Start: 2025-07-24 | End: 2025-07-24

## 2025-07-24 RX ADMIN — DEXAMETHASONE SODIUM PHOSPHATE 10 MG: 10 INJECTION, SOLUTION INTRAMUSCULAR; INTRAVENOUS at 09:53

## 2025-07-24 RX ADMIN — OXYCODONE HYDROCHLORIDE AND ACETAMINOPHEN 1 TABLET: 5; 325 TABLET ORAL at 11:19

## 2025-07-24 RX ADMIN — FENTANYL CITRATE 50 MCG: 50 INJECTION INTRAMUSCULAR; INTRAVENOUS at 09:48

## 2025-07-24 RX ADMIN — PROPOFOL 200 MG: 10 INJECTION, EMULSION INTRAVENOUS at 09:32

## 2025-07-24 RX ADMIN — FENTANYL CITRATE 50 MCG: 50 INJECTION INTRAMUSCULAR; INTRAVENOUS at 09:32

## 2025-07-24 RX ADMIN — MIDAZOLAM 2 MG: 1 INJECTION INTRAMUSCULAR; INTRAVENOUS at 09:27

## 2025-07-24 RX ADMIN — CEFAZOLIN SODIUM 2000 MG: 2 SOLUTION INTRAVENOUS at 09:41

## 2025-07-24 RX ADMIN — SODIUM CHLORIDE, SODIUM LACTATE, POTASSIUM CHLORIDE, AND CALCIUM CHLORIDE: .6; .31; .03; .02 INJECTION, SOLUTION INTRAVENOUS at 09:24

## 2025-07-24 RX ADMIN — ONDANSETRON 4 MG: 2 INJECTION INTRAMUSCULAR; INTRAVENOUS at 09:53

## 2025-07-24 RX ADMIN — LIDOCAINE HYDROCHLORIDE 100 MG: 20 INJECTION, SOLUTION EPIDURAL; INFILTRATION; INTRACAUDAL at 09:32

## 2025-07-24 RX ADMIN — ACETAMINOPHEN 975 MG: 325 TABLET ORAL at 08:05

## 2025-07-24 RX ADMIN — SODIUM CHLORIDE, SODIUM LACTATE, POTASSIUM CHLORIDE, AND CALCIUM CHLORIDE 20 ML/HR: .6; .31; .03; .02 INJECTION, SOLUTION INTRAVENOUS at 08:04

## 2025-07-24 NOTE — ANESTHESIA POSTPROCEDURE EVALUATION
Post-Op Assessment Note    CV Status:  Stable    Pain management: adequate       Mental Status:  Awake and sleepy   Hydration Status:  Stable   PONV Controlled:  None   Airway Patency:  Patent  Two or more mitigation strategies used for obstructive sleep apnea   Post Op Vitals Reviewed: Yes    No anethesia notable event occurred.    Staff: Anesthesiologist           Last Filed PACU Vitals:  Vitals Value Taken Time   Temp 96.5    Pulse 55    /82    Resp 15    SpO2 99%

## 2025-07-24 NOTE — INTERVAL H&P NOTE
H&P reviewed. After examining the patient I find no changes in the patients condition since the H&P had been written.    Vitals:    07/24/25 0800   BP: (!) 181/92   Pulse: 56   Resp: 15   Temp: (!) 97.4 °F (36.3 °C)   SpO2: 99%

## 2025-07-24 NOTE — OP NOTE
OPERATIVE REPORT  PATIENT NAME: Petros Lott    :  1955  MRN: [id number]  Pt Location: [facility] OR ROOM 02    SURGERY DATE: 2025    Surgeons and Role:     * Pasha Marx MD - Primary     * Evelio Alva PA-C - Assisting    Preop Diagnosis:  Melanoma in situ of face excluding eyelid, nose, lip, and ear (HCC) [D03.30]    Post-Op Diagnosis Codes:     * Melanoma in situ of face excluding eyelid, nose, lip, and ear (HCC) [D03.30]    Procedure(s):  Right - WIDE RADICAL EXCISION OF RIGHT CHEEK MELANOMA. LOCAL ADVANCEMENT FLAP CLOSURE    Specimen(s):  ID Type Source Tests Collected by Time Destination   1 : Wide radical excision of right cheek melanoma, suture makred, long lateral, short medial Tissue Soft Tissue, Other TISSUE EXAM Pasha Marx MD 2025 [id number]        Estimated Blood Loss:   Minimal    Drains:  * No LDAs found *    Anesthesia Type:   General    Operative Indications:  Melanoma in situ of face excluding eyelid, nose, lip, and ear (HCC) [D03.30]      Operative Findings:  Melanoma of right cheek       Complications:   None    Procedure and Technique:  This is a 70-year-old gentleman biopsy-proven melanoma in situ of right face.  He was seen in my office and surgical consent was obtained for wide radical excision.  He was brought in today taken to the operating room where he was placed in the OR table general anesthesia was induced.  Possible pressure points were questioned.  Right face was prepped and draped in a sterile normal fashion.  From the edges of the biopsy site 0.5 cm circumferentially marked with marking pen.  This was excised sharply in elliptical fashion the skin and superficial subcutaneous fat given this is melanoma in situ.  Wound size is 5 cm x 6 cm.  We attempted to close primarily however significant tension.  At this point skin with subcutaneous fat were undermined with electrocautery superiorly inferiorly and medially laterally.  Hemostasis were  rechecked and achieved.  Flap size is [id number].Once flaps were created we were able to close the wound without any tension.  Wound was closed in 2 layers 2-0 Vicryl and 5-0 Monocryl.  Exofin dressing in place. Sponge and instrument countx2   I was present for the entire procedure. and A physician assistant was required during the procedure for retraction, tissue handling, dissection and suturing.    Patient Disposition:  PACU     Wide Local Excision for Primary Cutaneous Melanoma - Excision 1 (Face)  Operation performed with curative intent Yes   Original Breslow thickness of the lesion Melanoma in situ (MIS)   Clinical margin width   (measured from the edge of the lesion or the prior excision scar) 0.5 cm   Depth of excision Only skin and superficial subcutaneous fat (melanoma in situ)          SIGNATURE: Pasha Marx MD  DATE: July 24, 2025  TIME: 10:00 AM

## 2025-07-24 NOTE — ANESTHESIA PREPROCEDURE EVALUATION
"Procedure:  WIDE RADICAL EXCISION OF RIGHT CHEEK MELANOMA (Right: Face)    Relevant Problems   CARDIO   (+) Hypercholesterolemia      /RENAL   (+) BPH without obstruction/lower urinary tract symptoms        Physical Exam    Airway     Mallampati score: III  TM Distance: >3 FB  Neck ROM: limited extension  Mouth opening: >= 4 cm      Cardiovascular      Dental   No notable dental hx     Pulmonary   No wheezes    Neurological    He appears alert and oriented x3.      Other Findings  post-pubertal.      Anesthesia Plan  ASA Score- 2     Anesthesia Type- general with ASA Monitors.         Additional Monitors:     Airway Plan: LMA and LMA.           Plan Factors-    Chart reviewed. EKG reviewed.  Existing labs reviewed. Patient summary reviewed.    Patient is not a current smoker.  Patient did not smoke on day of surgery.    Obstructive sleep apnea risk education given perioperatively.        Induction- intravenous.    Postoperative Plan- .   Monitoring Plan - Monitoring plan - standard ASA monitoring  Post Operative Pain Plan - multimodal analgesia        Informed Consent- Anesthetic plan and risks discussed with patient.  I personally reviewed this patient with the CRNA. Discussed and agreed on the Anesthesia Plan with the CRNA..      NPO Status:  No vitals data found for the desired time range.      VITALS  There were no vitals taken for this visit. BP Readings from Last 3 Encounters:   07/07/25 134/78   02/05/18 120/70   01/19/18 140/80        LABS  No results for input(s): \"WBC\", \"HGB\", \"HCT\", \"PLT\" in the last 8784 hours.  No results for input(s): \"APTT\", \"INR\", \"PTT\" in the last 8784 hours. Results from Last 12 Months   Lab Units 06/02/25  0704   SODIUM mmol/L 141   POTASSIUM mmol/L 5.2*   CHLORIDE mmol/L 103   CO2 mmol/L 25   ANION GAP mmol/L 13   BUN mg/dL 18   CREATININE mg/dL 0.9   CALCIUM mg/dL 9.4   GLUCOSE RANDOM mg/dL 103   HEMOGLOBIN A1C % 6*        ECG      ECHOCARDIOGRAPHY OR OTHER " TESTING/IMAGING  Encounter Date: 07/08/25   ECG 12 lead   Result Value    Ventricular Rate 58    Atrial Rate 58    FL Interval 184    QRSD Interval 98    QT Interval 420    QTC Interval 413    P Axis 21    QRS Axis 15    T Wave Axis 6    Narrative    Sinus bradycardia  Incomplete right bundle branch block  Anterior infarct , age undetermined  Confirmed by Altagracia Alexandra ([id number]) on 7/8/2025 2:42:54 PM     No results found for this or any previous visit. N/a     ------- ANESTHESIA RISK-BENEFIT DISCUSSION -------  BENEFITS OF A SPECIALIZED ANESTHESIA TEAM INCLUDE (NBK [id number], PMID 60131610):  (1) Reduce mortality and morbidity for major surgeries/procedures. (2) The team provides analgesia/sedation/amnesia/akinesia as safely as possible. (3) The team strives to reduce discomfort as safely as possible.    RISKS, AND PLANS TO MITIGATE RISKS, INCLUDE:    - Neurologic system: IntraOp awareness (Risk is ~1:1,000 - 1:14,000; PMID 55501352), Stroke (Risk ~<0.1-2% for most cases; PMID 56230621), nerve injury, vision loss, and POCD.  Since regional anesthesia may be used, risks of block failure, bleeding, infection, and permanent or temporary nerve injury or injury to other local structures were discussed.  - Airway and Pulmonary system: Dental or mouth injury, throat pain, critical hypoxia, pneumothorax, prolonged intubation, post-op respiratory compromise.  Airway/Intubation risks and prior data: No prior advanced airway notes in [facility] EMR  Major ARISCAT risk factors for pulmonary complications include: none, yielding a score of 0-1= Low risk, 1.6%.  - Cardiovascular system: Hypotension, arrhythmias, cardiac injury or arrest, blood clots, bleeding, infection, vascular injuries.  Lazarus's RCRI score items: none, yielding an RCRI Score of 0= 0.4% risk of MACE  Are roma-op or intra-op beta blockers indicated? (PMID 76475614): no  - FEN/GI system: Aspiration risk (~0.5% per PMC 7436042) and PONV (10-80% per Apfel score) especially  if the patient has not fasted.  ASA NPO guideline compliance?: Yes  - Medication risk assessment: Allergic reactions, excessive bleeding with anticoagulant use, overdoses, drug-drug interactions, injury to a fetus or  in pregnant or breastfeeding patients, roma-procedural sedation including while driving/operating machinery.  Recent relevant medications: See MAR or Med Review  Personal or family history of anesthesia complications: no  Pregnancy Status: N/A  - Estimate mortality risks associated with anesthesia based on ASA-PS (PMID 80392088): ASA-PS II: risk 1:20,000

## 2025-07-25 NOTE — ADDENDUM NOTE
Addendum  created 07/25/25 1731 by Vivek Ortega MD PhD    Attestation recorded in Intraprocedure, Intraprocedure Attestations filed

## 2025-07-25 NOTE — ANESTHESIA POSTPROCEDURE EVALUATION
Post-Op Assessment Note    CV Status:  Stable    Pain management: adequate       Mental Status:  Awake and alert   Hydration Status:  Stable   PONV Controlled:  None   Airway Patency:  Patent  Two or more mitigation strategies used for obstructive sleep apnea   Post Op Vitals Reviewed: Yes    No anethesia notable event occurred.    Staff: Anesthesiologist           Last Filed PACU Vitals:  Vitals Value Taken Time   Temp 97.5 °F (36.4 °C) 07/24/25 10:28   Pulse 49 07/24/25 10:56   /83 07/24/25 10:45   Resp 14 07/24/25 10:56   SpO2 96 % 07/24/25 10:56   Vitals shown include unfiled device data.    Modified Guzman:     Vitals Value Taken Time   Activity 2 07/24/25 10:30   Respiration 2 07/24/25 10:30   Circulation 2 07/24/25 10:30   Consciousness 1 07/24/25 10:30   Oxygen Saturation 2 07/24/25 10:30     Modified Guzman Score: 9

## 2025-07-28 ENCOUNTER — PATIENT MESSAGE (OUTPATIENT)
Age: 70
End: 2025-07-28

## 2025-07-28 ENCOUNTER — TELEPHONE (OUTPATIENT)
Age: 70
End: 2025-07-28

## 2025-07-28 ENCOUNTER — OFFICE VISIT (OUTPATIENT)
Dept: SURGICAL ONCOLOGY | Facility: CLINIC | Age: 70
End: 2025-07-28

## 2025-07-28 VITALS
TEMPERATURE: 97.6 F | SYSTOLIC BLOOD PRESSURE: 146 MMHG | DIASTOLIC BLOOD PRESSURE: 90 MMHG | HEART RATE: 64 BPM | WEIGHT: 233.69 LBS | OXYGEN SATURATION: 98 % | RESPIRATION RATE: 18 BRPM | BODY MASS INDEX: 33.46 KG/M2 | HEIGHT: 70 IN

## 2025-07-28 DIAGNOSIS — D03.30 MELANOMA IN SITU OF FACE EXCLUDING EYELID, NOSE, LIP, AND EAR (HCC): Primary | ICD-10-CM

## 2025-07-28 PROCEDURE — 99024 POSTOP FOLLOW-UP VISIT: CPT | Performed by: SURGERY

## 2025-07-28 RX ORDER — CEPHALEXIN 500 MG/1
500 CAPSULE ORAL EVERY 6 HOURS SCHEDULED
Qty: 56 CAPSULE | Refills: 0 | Status: SHIPPED | OUTPATIENT
Start: 2025-07-28 | End: 2025-08-11

## 2025-07-29 PROCEDURE — 88305 TISSUE EXAM BY PATHOLOGIST: CPT | Performed by: PATHOLOGY

## 2025-07-30 ENCOUNTER — TELEPHONE (OUTPATIENT)
Dept: SURGICAL ONCOLOGY | Facility: CLINIC | Age: 70
End: 2025-07-30

## 2025-07-30 ENCOUNTER — NURSE TRIAGE (OUTPATIENT)
Age: 70
End: 2025-07-30

## 2025-07-31 ENCOUNTER — OFFICE VISIT (OUTPATIENT)
Dept: SURGICAL ONCOLOGY | Facility: CLINIC | Age: 70
End: 2025-07-31
Payer: MEDICARE

## 2025-07-31 VITALS
OXYGEN SATURATION: 98 % | DIASTOLIC BLOOD PRESSURE: 86 MMHG | HEART RATE: 58 BPM | SYSTOLIC BLOOD PRESSURE: 148 MMHG | TEMPERATURE: 97.9 F

## 2025-07-31 DIAGNOSIS — D03.30 MELANOMA IN SITU OF FACE EXCLUDING EYELID, NOSE, LIP, AND EAR (HCC): Primary | ICD-10-CM

## 2025-07-31 PROCEDURE — 99213 OFFICE O/P EST LOW 20 MIN: CPT | Performed by: SURGERY

## 2025-08-01 ENCOUNTER — PATIENT OUTREACH (OUTPATIENT)
Dept: HEMATOLOGY ONCOLOGY | Facility: CLINIC | Age: 70
End: 2025-08-01

## 2025-08-15 ENCOUNTER — OFFICE VISIT (OUTPATIENT)
Dept: SURGICAL ONCOLOGY | Facility: CLINIC | Age: 70
End: 2025-08-15
Payer: MEDICARE

## 2025-08-15 PROBLEM — Z85.828 ENCOUNTER FOR FOLLOW-UP SURVEILLANCE OF SKIN CANCER: Status: ACTIVE | Noted: 2025-08-15

## 2025-08-15 PROBLEM — Z08 ENCOUNTER FOR FOLLOW-UP SURVEILLANCE OF SKIN CANCER: Status: ACTIVE | Noted: 2025-08-15

## (undated) DEVICE — 3M™ STERI-STRIP™ REINFORCED ADHESIVE SKIN CLOSURES, R1547, 1/2 IN X 4 IN (12 MM X 100 MM), 6 STRIPS/ENVELOPE: Brand: 3M™ STERI-STRIP™

## (undated) DEVICE — REM POLYHESIVE ADULT PATIENT RETURN ELECTRODE: Brand: VALLEYLAB

## (undated) DEVICE — ANTIBACTERIAL UNDYED BRAIDED (POLYGLACTIN 910), SYNTHETIC ABSORBABLE SUTURE: Brand: COATED VICRYL

## (undated) DEVICE — Device

## (undated) DEVICE — PACK UNIVERSAL DRAPES SUB-Q ICD

## (undated) DEVICE — NEPTUNE E-SEP SMOKE EVACUATION PENCIL, COATED, 70MM BLADE, PUSH BUTTON SWITCH: Brand: NEPTUNE E-SEP

## (undated) DEVICE — MEDI-VAC YANK SUCT HNDL W/TPRD BULBOUS TIP: Brand: CARDINAL HEALTH

## (undated) DEVICE — DECANTER: Brand: UNBRANDED

## (undated) DEVICE — INTENDED FOR TISSUE SEPARATION, AND OTHER PROCEDURES THAT REQUIRE A SHARP SURGICAL BLADE TO PUNCTURE OR CUT.: Brand: BARD-PARKER SAFETY BLADES SIZE 15, STERILE

## (undated) DEVICE — BETHLEHEM UNIVERSAL MINOR GEN: Brand: CARDINAL HEALTH

## (undated) DEVICE — 4-PORT MANIFOLD: Brand: NEPTUNE 2